# Patient Record
Sex: MALE | Race: WHITE
[De-identification: names, ages, dates, MRNs, and addresses within clinical notes are randomized per-mention and may not be internally consistent; named-entity substitution may affect disease eponyms.]

---

## 2017-08-21 ENCOUNTER — HOSPITAL ENCOUNTER (OUTPATIENT)
Dept: HOSPITAL 84 - D.LABREF | Age: 77
Discharge: HOME | End: 2017-08-21
Attending: FAMILY MEDICINE
Payer: MEDICARE

## 2017-08-21 VITALS — BODY MASS INDEX: 31.6 KG/M2

## 2017-08-21 DIAGNOSIS — I50.9: ICD-10-CM

## 2017-08-21 DIAGNOSIS — N18.9: Primary | ICD-10-CM

## 2017-08-21 LAB
ALBUMIN SERPL-MCNC: 3 G/DL (ref 3.4–5)
ALP SERPL-CCNC: 129 U/L (ref 46–116)
ALT SERPL-CCNC: 17 U/L (ref 10–68)
ANION GAP SERPL CALC-SCNC: 14 MMOL/L (ref 8–16)
BASOPHILS NFR BLD AUTO: 0.6 % (ref 0–2)
BILIRUB SERPL-MCNC: 0.68 MG/DL (ref 0.2–1.3)
BUN SERPL-MCNC: 25 MG/DL (ref 7–18)
CALCIUM SERPL-MCNC: 8.5 MG/DL (ref 8.5–10.1)
CHLORIDE SERPL-SCNC: 96 MMOL/L (ref 98–107)
CO2 SERPL-SCNC: 29.5 MMOL/L (ref 21–32)
CREAT SERPL-MCNC: 1.4 MG/DL (ref 0.6–1.3)
EOSINOPHIL NFR BLD: 3.6 % (ref 0–7)
ERYTHROCYTE [DISTWIDTH] IN BLOOD BY AUTOMATED COUNT: 13.2 % (ref 11.5–14.5)
GLOBULIN SER-MCNC: 4.1 G/L
GLUCOSE SERPL-MCNC: 121 MG/DL (ref 74–106)
HCT VFR BLD CALC: 52.7 % (ref 42–54)
HGB BLD-MCNC: 17.3 G/DL (ref 13.5–17.5)
IMM GRANULOCYTES NFR BLD: 0.3 % (ref 0–5)
LYMPHOCYTES NFR BLD AUTO: 28.6 % (ref 15–50)
MCH RBC QN AUTO: 29.2 PG (ref 26–34)
MCHC RBC AUTO-ENTMCNC: 32.8 G/DL (ref 31–37)
MCV RBC: 89 FL (ref 80–100)
MONOCYTES NFR BLD: 6.7 % (ref 2–11)
NEUTROPHILS NFR BLD AUTO: 60.2 % (ref 40–80)
OSMOLALITY SERPL CALC.SUM OF ELEC: 276 MOSM/KG (ref 275–300)
PLATELET # BLD: 139 10X3/UL (ref 130–400)
PMV BLD AUTO: 11.4 FL (ref 7.4–10.4)
POTASSIUM SERPL-SCNC: 3.5 MMOL/L (ref 3.5–5.1)
PROT SERPL-MCNC: 7.1 G/DL (ref 6.4–8.2)
RBC # BLD AUTO: 5.92 10X6/UL (ref 4.2–6.1)
SODIUM SERPL-SCNC: 136 MMOL/L (ref 136–145)
WBC # BLD AUTO: 6.8 10X3/UL (ref 4.8–10.8)

## 2017-09-11 ENCOUNTER — HOSPITAL ENCOUNTER (OUTPATIENT)
Dept: HOSPITAL 84 - D.LABREF | Age: 77
Discharge: HOME | End: 2017-09-11
Attending: FAMILY MEDICINE
Payer: MEDICARE

## 2017-09-11 VITALS — BODY MASS INDEX: 31.6 KG/M2

## 2017-09-11 DIAGNOSIS — E11.9: Primary | ICD-10-CM

## 2017-09-11 DIAGNOSIS — Z87.440: ICD-10-CM

## 2017-09-11 LAB
APPEARANCE UR: CLEAR
BACTERIA #/AREA URNS HPF: (no result) /HPF
BILIRUB SERPL-MCNC: NEGATIVE MG/DL
COLOR UR: YELLOW
EST. AVERAGE GLUCOSE BLD GHB EST-MCNC: 249 MG/DL (ref 74–154)
GLUCOSE SERPL-MCNC: 50 MG/DL
HBA1C MFR BLD: 10.3 % (ref 4.8–6)
KETONES UR STRIP-MCNC: NEGATIVE MG/DL
LEUKOCYTE ESTERASE: (no result)
NITRITE UR-MCNC: NEGATIVE MG/ML
PH UR STRIP: 5 [PH] (ref 5–6)
PROT UR-MCNC: (no result) MG/DL
RBC #/AREA URNS HPF: >50 /HPF (ref 0–5)
SP GR UR STRIP: 1.01 (ref 1–1.02)
SQUAMOUS #/AREA URNS HPF: (no result) /HPF (ref 0–5)
UROBILINOGEN UR-MCNC: 1 MG/DL

## 2017-10-12 ENCOUNTER — HOSPITAL ENCOUNTER (OUTPATIENT)
Dept: HOSPITAL 84 - D.LABREF | Age: 77
Discharge: HOME | End: 2017-10-12
Attending: FAMILY MEDICINE
Payer: MEDICARE

## 2017-10-12 VITALS — BODY MASS INDEX: 31.6 KG/M2

## 2017-10-12 DIAGNOSIS — Z86.73: ICD-10-CM

## 2017-10-12 DIAGNOSIS — N39.0: ICD-10-CM

## 2017-10-12 DIAGNOSIS — Z46.6: Primary | ICD-10-CM

## 2017-10-12 DIAGNOSIS — E11.51: ICD-10-CM

## 2017-10-12 DIAGNOSIS — N40.0: ICD-10-CM

## 2017-10-14 ENCOUNTER — HOSPITAL ENCOUNTER (INPATIENT)
Dept: HOSPITAL 84 - D.ER | Age: 77
LOS: 3 days | Discharge: HOME HEALTH | DRG: 698 | End: 2017-10-17
Attending: FAMILY MEDICINE | Admitting: FAMILY MEDICINE
Payer: MEDICARE

## 2017-10-14 VITALS — BODY MASS INDEX: 32.3 KG/M2 | BODY MASS INDEX: 33.2 KG/M2

## 2017-10-14 DIAGNOSIS — E11.22: ICD-10-CM

## 2017-10-14 DIAGNOSIS — Z86.73: ICD-10-CM

## 2017-10-14 DIAGNOSIS — I25.10: ICD-10-CM

## 2017-10-14 DIAGNOSIS — N40.0: ICD-10-CM

## 2017-10-14 DIAGNOSIS — M17.0: ICD-10-CM

## 2017-10-14 DIAGNOSIS — N18.9: ICD-10-CM

## 2017-10-14 DIAGNOSIS — Z79.4: ICD-10-CM

## 2017-10-14 DIAGNOSIS — Z74.01: ICD-10-CM

## 2017-10-14 DIAGNOSIS — I13.0: ICD-10-CM

## 2017-10-14 DIAGNOSIS — G93.41: ICD-10-CM

## 2017-10-14 DIAGNOSIS — E11.65: ICD-10-CM

## 2017-10-14 DIAGNOSIS — T83.511A: Primary | ICD-10-CM

## 2017-10-14 DIAGNOSIS — N39.0: ICD-10-CM

## 2017-10-14 DIAGNOSIS — N17.9: ICD-10-CM
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
## 2017-10-14 DIAGNOSIS — K21.9: ICD-10-CM

## 2017-10-14 DIAGNOSIS — I50.9: ICD-10-CM

## 2017-10-14 DIAGNOSIS — E86.0: ICD-10-CM

## 2017-10-14 LAB
ALBUMIN SERPL-MCNC: 2.8 G/DL (ref 3.4–5)
ALP SERPL-CCNC: 173 U/L (ref 46–116)
ALT SERPL-CCNC: 14 U/L (ref 10–68)
AMORPHOUS SEDIMENT: (no result) /LPF
ANION GAP SERPL CALC-SCNC: 12.2 MMOL/L (ref 8–16)
APPEARANCE UR: CLEAR
BACTERIA #/AREA URNS HPF: (no result) /HPF
BASOPHILS NFR BLD AUTO: 0.6 % (ref 0–2)
BILIRUB SERPL-MCNC: 0.62 MG/DL (ref 0.2–1.3)
BILIRUB SERPL-MCNC: NEGATIVE MG/DL
BUN SERPL-MCNC: 27 MG/DL (ref 7–18)
CALCIUM SERPL-MCNC: 9.3 MG/DL (ref 8.5–10.1)
CHLORIDE SERPL-SCNC: 101 MMOL/L (ref 98–107)
CK SERPL-CCNC: 32 UL (ref 21–232)
CO2 SERPL-SCNC: 31.4 MMOL/L (ref 21–32)
COLOR UR: YELLOW
CREAT SERPL-MCNC: 1.4 MG/DL (ref 0.6–1.3)
EOSINOPHIL NFR BLD: 3.3 % (ref 0–7)
ERYTHROCYTE [DISTWIDTH] IN BLOOD BY AUTOMATED COUNT: 13.6 % (ref 11.5–14.5)
GLOBULIN SER-MCNC: 4.8 G/L
GLUCOSE SERPL-MCNC: 1000 MG/DL
GLUCOSE SERPL-MCNC: 323 MG/DL (ref 74–106)
HCT VFR BLD CALC: 51.4 % (ref 42–54)
HGB BLD-MCNC: 16.9 G/DL (ref 13.5–17.5)
IMM GRANULOCYTES NFR BLD: 0.4 % (ref 0–5)
KETONES SERPL-MCNC: NEGATIVE MG/DL
KETONES UR STRIP-MCNC: NEGATIVE MG/DL
LYMPHOCYTES NFR BLD AUTO: 40.6 % (ref 15–50)
MCH RBC QN AUTO: 29.5 PG (ref 26–34)
MCHC RBC AUTO-ENTMCNC: 32.9 G/DL (ref 31–37)
MCV RBC: 89.9 FL (ref 80–100)
MONOCYTES NFR BLD: 6.7 % (ref 2–11)
NEUTROPHILS NFR BLD AUTO: 48.4 % (ref 40–80)
NITRITE UR-MCNC: POSITIVE MG/ML
OSMOLALITY SERPL CALC.SUM OF ELEC: 295 MOSM/KG (ref 275–300)
PH UR STRIP: 5 [PH] (ref 5–6)
PLATELET # BLD: 131 10X3/UL (ref 130–400)
PMV BLD AUTO: 11.1 FL (ref 7.4–10.4)
POTASSIUM SERPL-SCNC: 4.6 MMOL/L (ref 3.5–5.1)
PROT SERPL-MCNC: 7.6 G/DL (ref 6.4–8.2)
PROT UR-MCNC: (no result) MG/DL
RBC # BLD AUTO: 5.72 10X6/UL (ref 4.2–6.1)
SODIUM SERPL-SCNC: 140 MMOL/L (ref 136–145)
SP GR UR STRIP: 1.01 (ref 1–1.02)
SQUAMOUS #/AREA URNS HPF: (no result) /HPF (ref 0–5)
TROPONIN I SERPL-MCNC: < 0.017 NG/ML (ref 0–0.06)
UROBILINOGEN UR-MCNC: NORMAL MG/DL
WBC # BLD AUTO: 4.9 10X3/UL (ref 4.8–10.8)
WBC #/AREA URNS HPF: (no result) /HPF (ref 0–5)

## 2017-10-15 VITALS — SYSTOLIC BLOOD PRESSURE: 108 MMHG | DIASTOLIC BLOOD PRESSURE: 58 MMHG

## 2017-10-15 VITALS — DIASTOLIC BLOOD PRESSURE: 62 MMHG | SYSTOLIC BLOOD PRESSURE: 138 MMHG

## 2017-10-15 VITALS — SYSTOLIC BLOOD PRESSURE: 148 MMHG | DIASTOLIC BLOOD PRESSURE: 72 MMHG

## 2017-10-15 VITALS — SYSTOLIC BLOOD PRESSURE: 110 MMHG | DIASTOLIC BLOOD PRESSURE: 59 MMHG

## 2017-10-15 VITALS — SYSTOLIC BLOOD PRESSURE: 133 MMHG | DIASTOLIC BLOOD PRESSURE: 63 MMHG

## 2017-10-15 LAB
ANION GAP SERPL CALC-SCNC: 11.4 MMOL/L (ref 8–16)
BASOPHILS NFR BLD AUTO: 0.4 % (ref 0–2)
BUN SERPL-MCNC: 29 MG/DL (ref 7–18)
CALCIUM SERPL-MCNC: 9.1 MG/DL (ref 8.5–10.1)
CHLORIDE SERPL-SCNC: 101 MMOL/L (ref 98–107)
CO2 SERPL-SCNC: 29.9 MMOL/L (ref 21–32)
CREAT SERPL-MCNC: 1.4 MG/DL (ref 0.6–1.3)
EOSINOPHIL NFR BLD: 2 % (ref 0–7)
ERYTHROCYTE [DISTWIDTH] IN BLOOD BY AUTOMATED COUNT: 13.6 % (ref 11.5–14.5)
GLUCOSE SERPL-MCNC: 282 MG/DL (ref 74–106)
HCT VFR BLD CALC: 50.2 % (ref 42–54)
HGB BLD-MCNC: 16.7 G/DL (ref 13.5–17.5)
IMM GRANULOCYTES NFR BLD: 0.4 % (ref 0–5)
LYMPHOCYTES NFR BLD AUTO: 25.5 % (ref 15–50)
MCH RBC QN AUTO: 29.6 PG (ref 26–34)
MCHC RBC AUTO-ENTMCNC: 33.3 G/DL (ref 31–37)
MCV RBC: 88.8 FL (ref 80–100)
MONOCYTES NFR BLD: 9.3 % (ref 2–11)
NEUTROPHILS NFR BLD AUTO: 62.4 % (ref 40–80)
OSMOLALITY SERPL CALC.SUM OF ELEC: 291 MOSM/KG (ref 275–300)
PLATELET # BLD: 141 10X3/UL (ref 130–400)
PMV BLD AUTO: 11 FL (ref 7.4–10.4)
POTASSIUM SERPL-SCNC: 4.3 MMOL/L (ref 3.5–5.1)
RBC # BLD AUTO: 5.65 10X6/UL (ref 4.2–6.1)
SODIUM SERPL-SCNC: 138 MMOL/L (ref 136–145)
WBC # BLD AUTO: 7.2 10X3/UL (ref 4.8–10.8)

## 2017-10-15 NOTE — NUR
RECEIVED REPORT. ASSUMED CARE OF PATIENT. RESTING IN BED WITH EYES OPEN, IV
FLUIDS INFUSING AS ORDERED. RESP EVEN AND UNLABORED. DENIES NEEDS AT THIS
TIME. NO DISTRESS.

## 2017-10-15 NOTE — NUR
CALLED AND SPOKE TO PATIENTS WIFE AND PATIENT DOES TAKE HIS PILLS WHOLE, DOES
NOT NEED THEM CRUSHED. THANKED PATIENTS WIFE. SHE WILL BE UP TOMORROW. SHE IS
NOT FEELING WELL TODAY.

## 2017-10-15 NOTE — NUR
PT RESTING WITH EYES CLOSED.  RESP EVEN AND REGULAR.  SR UP X2, CALL LIGHT
WITHIN REACH AND BED ALARM ON.

## 2017-10-15 NOTE — NUR
PT ARRIVED VIA STRETCHER FROM ER AT 0010 HRS.  STAFF X4 TO PUT IN BED.  PT
BEDBOUND.  PT ALERT, ORIENTED TO PERSON AND PLACE.  REORIENTED TO TIME AND
SITUATION.  CHRONIC NORWOOD FROM HOME NOTED.  URINE DARK.  IV TO L HAND WITH
INVANZ INFUSING.  O2 2LNC.  LUNGS DIMINISHED IN BASES BILAT.  LOWER
EXTREMITIES WITH IMPAIRED ROM AND FOOT DROP.  NUMEROUS SMALL SORES NOTED TO
BACK OF UPPER THIGHS AND LOWER BACK.  DRESSING TO R HIP CLEAN, DRY AND INTACT.
 WIFE STATED SHE PUT THAT DRESSING ON AS PT KEPT SCRATCHING ONE OF THE SORES.
BED ALRM ON.  SR UP X2, CALL LIGHT WITHIN REACH.

## 2017-10-15 NOTE — NUR
PT REPOSITIONED IN  BED FOR COMFORT.  SCD'S PLACED.  WILL CONTINEU TO MONITOR.
 SR UP X2, CALL LIGHT WITHIN REACH AND BED ALARM ON.

## 2017-10-15 NOTE — NUR
RESUMED CARE OF PT, LYING IN BED RESPIRATIONS EVEN AND UNLABORED ON 2LPM VIA
NC.  NORWOOD TO GRAVITY.  LEFT HAND INFUSING NS @ 100.  FAMILY AT BEDSIDE, CALL
LIGHT IN REACH.  WILL CONTINUE TO MONITOR.  SEE NURSE ASSESSMENT.

## 2017-10-16 VITALS — SYSTOLIC BLOOD PRESSURE: 118 MMHG | DIASTOLIC BLOOD PRESSURE: 56 MMHG

## 2017-10-16 VITALS — DIASTOLIC BLOOD PRESSURE: 60 MMHG | SYSTOLIC BLOOD PRESSURE: 122 MMHG

## 2017-10-16 VITALS — DIASTOLIC BLOOD PRESSURE: 50 MMHG | SYSTOLIC BLOOD PRESSURE: 131 MMHG

## 2017-10-16 VITALS — SYSTOLIC BLOOD PRESSURE: 133 MMHG | DIASTOLIC BLOOD PRESSURE: 52 MMHG

## 2017-10-16 VITALS — SYSTOLIC BLOOD PRESSURE: 129 MMHG | DIASTOLIC BLOOD PRESSURE: 69 MMHG

## 2017-10-16 VITALS — SYSTOLIC BLOOD PRESSURE: 137 MMHG | DIASTOLIC BLOOD PRESSURE: 66 MMHG

## 2017-10-16 LAB
ANION GAP SERPL CALC-SCNC: 11.5 MMOL/L (ref 8–16)
BASOPHILS NFR BLD AUTO: 0.3 % (ref 0–2)
BUN SERPL-MCNC: 22 MG/DL (ref 7–18)
CALCIUM SERPL-MCNC: 8.9 MG/DL (ref 8.5–10.1)
CHLORIDE SERPL-SCNC: 104 MMOL/L (ref 98–107)
CO2 SERPL-SCNC: 29.5 MMOL/L (ref 21–32)
CREAT SERPL-MCNC: 1.1 MG/DL (ref 0.6–1.3)
EOSINOPHIL NFR BLD: 3 % (ref 0–7)
ERYTHROCYTE [DISTWIDTH] IN BLOOD BY AUTOMATED COUNT: 13.8 % (ref 11.5–14.5)
GLUCOSE SERPL-MCNC: 236 MG/DL (ref 74–106)
HCT VFR BLD CALC: 47.9 % (ref 42–54)
HGB BLD-MCNC: 16 G/DL (ref 13.5–17.5)
IMM GRANULOCYTES NFR BLD: 0.5 % (ref 0–5)
LYMPHOCYTES NFR BLD AUTO: 29.4 % (ref 15–50)
MAGNESIUM SERPL-MCNC: 1.5 MG/DL (ref 1.8–2.4)
MCH RBC QN AUTO: 29.5 PG (ref 26–34)
MCHC RBC AUTO-ENTMCNC: 33.4 G/DL (ref 31–37)
MCV RBC: 88.4 FL (ref 80–100)
MONOCYTES NFR BLD: 8.9 % (ref 2–11)
NEUTROPHILS NFR BLD AUTO: 57.9 % (ref 40–80)
OSMOLALITY SERPL CALC.SUM OF ELEC: 291 MOSM/KG (ref 275–300)
PLATELET # BLD: 137 10X3/UL (ref 130–400)
PMV BLD AUTO: 11.4 FL (ref 7.4–10.4)
POTASSIUM SERPL-SCNC: 4 MMOL/L (ref 3.5–5.1)
RBC # BLD AUTO: 5.42 10X6/UL (ref 4.2–6.1)
SODIUM SERPL-SCNC: 141 MMOL/L (ref 136–145)
WBC # BLD AUTO: 6.3 10X3/UL (ref 4.8–10.8)

## 2017-10-16 NOTE — NUR
PT IN BED RESTING QUIETLY. DENIES ANY PAIN OR NEEDS AT THIS TIME. BED IN LOW
POSITION, CALL LIGHT WITHIN REACH.

## 2017-10-16 NOTE — NUR
PT IN BED RESTING QUEITLY. DENIES ANY PAIN OR NEEDS AT THIS TIME. BED IN LOW
POSITION, CALL LIGHT WITHIN REACH.

## 2017-10-16 NOTE — NUR
AM ROOUNDING WITH PATIENT APPEARING ASLEEP. RESP ARE EVEN AND NON LABORED. ON
BACK. NORWOOD CATH SEEN WITH YELLOW URINE. BILATERAL SCD'S ARE IN USE. LEFT HAND
SEEN WITH NS INFUSING  CC/HR. POSEY ALARM IN USE. WILL MONITOR.

## 2017-10-17 VITALS — DIASTOLIC BLOOD PRESSURE: 49 MMHG | SYSTOLIC BLOOD PRESSURE: 121 MMHG

## 2017-10-17 VITALS — SYSTOLIC BLOOD PRESSURE: 143 MMHG | DIASTOLIC BLOOD PRESSURE: 65 MMHG

## 2017-10-17 VITALS — SYSTOLIC BLOOD PRESSURE: 149 MMHG | DIASTOLIC BLOOD PRESSURE: 64 MMHG

## 2017-10-17 LAB
ANION GAP SERPL CALC-SCNC: 9 MMOL/L (ref 8–16)
BASOPHILS NFR BLD AUTO: 0.5 % (ref 0–2)
BUN SERPL-MCNC: 15 MG/DL (ref 7–18)
CALCIUM SERPL-MCNC: 8.6 MG/DL (ref 8.5–10.1)
CHLORIDE SERPL-SCNC: 105 MMOL/L (ref 98–107)
CO2 SERPL-SCNC: 30.6 MMOL/L (ref 21–32)
CREAT SERPL-MCNC: 1 MG/DL (ref 0.6–1.3)
EOSINOPHIL NFR BLD: 3.9 % (ref 0–7)
ERYTHROCYTE [DISTWIDTH] IN BLOOD BY AUTOMATED COUNT: 13.7 % (ref 11.5–14.5)
GLUCOSE SERPL-MCNC: 182 MG/DL (ref 74–106)
HCT VFR BLD CALC: 47.4 % (ref 42–54)
HGB BLD-MCNC: 15.6 G/DL (ref 13.5–17.5)
IMM GRANULOCYTES NFR BLD: 0.4 % (ref 0–5)
LYMPHOCYTES NFR BLD AUTO: 26.4 % (ref 15–50)
MCH RBC QN AUTO: 29.3 PG (ref 26–34)
MCHC RBC AUTO-ENTMCNC: 32.9 G/DL (ref 31–37)
MCV RBC: 88.9 FL (ref 80–100)
MONOCYTES NFR BLD: 10.1 % (ref 2–11)
NEUTROPHILS NFR BLD AUTO: 58.7 % (ref 40–80)
OSMOLALITY SERPL CALC.SUM OF ELEC: 286 MOSM/KG (ref 275–300)
PLATELET # BLD: 125 10X3/UL (ref 130–400)
PMV BLD AUTO: 11.2 FL (ref 7.4–10.4)
POTASSIUM SERPL-SCNC: 3.6 MMOL/L (ref 3.5–5.1)
RBC # BLD AUTO: 5.33 10X6/UL (ref 4.2–6.1)
SODIUM SERPL-SCNC: 141 MMOL/L (ref 136–145)
WBC # BLD AUTO: 5.6 10X3/UL (ref 4.8–10.8)

## 2017-10-17 NOTE — NUR
NORWOOD CATHETER CHANGED AT THIS TIME PER ORDERS FROM /[facility]. 16 FR
NORWOOD PLACED VIA STERILE TECHNIQUE. APPROX 75 CC PALE YELLOW URINE RECEIVED
UPON INSERTION OF F/C. NORWOOD CATHETER TO DRAINAGE BAG VIA GRAVITY. PATIENT
TOLERATED PROCEDURE WELL. PATIENT DENIES ANY PAIN OR DISCOMFORT. CALL LIGHT
PLACED WITHIN REACH. NO DISTRESS.

## 2017-10-17 NOTE — NUR
20 GAUGE IV REMOVED FROM LEFT HAND AS PATIENT IS BEING DISCHARGED TO HOME VIA
AMBULANCE. CATHETER INTACT. NO BLEEDING FROM SITE. 2X2 GAUZE APPLIED AND
SECURED WITH TAPE. TOLERATED REMOVAL OF IV WELL. NO DISTRESS.

## 2017-10-17 NOTE — NUR
CALLED AND SPOKE TO PATIENTS WIFE. [facility] HERE TO GET PATIENT AT THIS TIME.
PATIENT ATTEMPTED TO SIGN HIS DISCHARGE INSTRUCTIONS. INSTRUCTIONS PROVIDED TO
WIFE AND PATIENT AND WENT OVER MEDICATIONS CALLED TO THE PHARMACY.

## 2017-10-17 NOTE — NUR
10/16/17 Wound care (late entry) 1245
Noted numerous scabbed areas on legs and trunk-pt states that it was a "rash".
There is an open wound on right hip that measures 1.5cm x 1.5cm.  It has dry
wound bed and periwound. No drainage or odor noted.
Just above gluteal cleft is a skin fold running vertically. A skin tear is
noted in the fold.  There is another skin fold running diagonally that has a
skin tear.
The pt is being repositioned q 2 hours with maximum assistance.  An air
overlay mattress was placed on the bed to help decrease pressure and moisture.
He is incontinent.
Recommend calmoseptine cream to protect from moisture and covering right hip
open wound with bordered gauze to protect it.  Continue q2h position changes
and personal care as necessary.
Wound care will continue to monitor.

## 2017-10-17 NOTE — NUR
PAT[facility]. NO DISTRESS
UPON LEAVING UNIT. WIFE CALLED AND NOTIFIED THAT PATIENT WAS ON HIS WAY AT
THIS TIME.

## 2017-10-17 NOTE — NUR
Patient Name: ADAM BARKSDALE Admission Status: ER
Accout number: [id number] Admission Date: 10-
: 1940 Admission Diagnosis:
Attending: ROWDY SANTANA Current LOS: 3
 
Anticipated DC Date: 10-
Planned Disposition: Home with Home Health
Primary Insurance: MEDICARE A & B
PLANNED EXTERNAL PROVIDER: [facility]
 
LATE ENTRY:
Discharge Planning Comments:
* Is the patient Alert and Oriented? Yes 0
* How many steps to enter\exit or inside your home? RAMP 0
* PCP DR. VELASCO 0
* Pharmacy [facility]
* Preadmission Environment Home with Family 0
* ADLs Partial Dependent 0
* Partial ADLs (Assistance needed) Bathing
Dressing
Medication Management
Toileting
Transfers 0
* Equipment Hospital Bed
Suman Lift
Wheelchair 0
* Other Equipment NO MEDICAL EQUIPMENT PROVIDER PREFERENCE 0
* List name and contact numbers for known caregivers / representatives who
currently or will assist patient after discharge: HUSSEIN BARKSDALE, SPOUSE,
[phone] 0
* Community resources currently utilized Home Health 0
* Please name any agencies selected above. [facility] 0
* Additional services required to return to the preadmission environment? No 0
* Can the patient safely return to the preadmission environment? Yes 0
* Has this patient been hospitalized within the prior 30 days at any hospital?
No 0
 
CM RECEIVED DISCHARGE ORDER, MET WITH PT IN ROOM TO DISCUSS DISCHARGE PLANNING
AND NEEDS. PT REPORTS HAVING ALL NEEDED MEDICAL EQUIPMENT AT HOME AND PT'S
SPOUSE TAKES CARE OF ALL OF PT'S NEEDS. PT HAS HOME HEALTH WITH [facility]. PT'S
NURSE REPORTS PT ALSO HAS [facility] VISITING; PT'S NURSE HAS SPOKEN TO
PT'S SPOUSE WHO IS IN AGREEMENT WITH DISCHARGE PLAN HOME TODAY WITH HOME
HEALTH RESUMPTION, SHE IS HOME TO RECEIVE PT VIA AMBULANCE. PT'S NURSE WILL
CALL AMBULANCE AND NOTIFY PT'S SPOUSE WHEN AMBULANCE LEAVES HOSPITAL WITH PT.
PT HAPPY ABOUT GETTING TO GO HOME, IMPORTANT MESSAGE FROM MEDICARE PROVIDED
AND DISCUSSED. CM RECEIVED CALL FROM LAKEISHA OF [facility], SHE HAS
BEEN CONTACTED BY PT'S SPOUSE AND REQUESTED DISCHARGE INFORMATION. CM FAXED
DISCHARGE INFORMATION TO [facility] -[phone].
 
: Vitor Nathan

## 2017-10-17 NOTE — NUR
SPPOKE TO PATIENTS WIFE AND SHE IS UPSET THAT PATIENT IS BEING DISCHARGED
PRIOR TO HAVING RASH DIAGNOSED ON HIS BACK. PATIENTS WIFE STATES THAT WE HAVE
TO LET DISPATCH FROM [facility] KNOW THAT IT TOOK 6 PEOPLE TO GET HIM TO THE
BEDROOM ONTO THE STRETCHER BECAUSE THEY CAN'T GET THE STRETCHER DOWN THE [address].
INFORMED PATIENTS WIFE THAT I WOULD LET TRANSPORT KNOW AND THAT ALSO WE WOULD
BE CHANGING PATIENTS NORWOOD CATHETER PRIOR TO HIM LEAVING TODAY. WILL CALL
PATIENTS WIFE WHEN PATIENT LEAVES FACILITY TO HOME.

## 2017-10-17 NOTE — NUR
SPOKE WITH TREVOR AT [facility] TO ARRANGE TRANSPORTATION HOME. INFORMED TREVOR OF
THE NEED FOR EXTRA PEOPLE TO GET PATIENT INTO THE HOME. TREVOR STATED HE SHOULD
HAVE SOMEONE HERE WITHIN ABOUT 45 MINUTES

## 2017-12-06 ENCOUNTER — HOSPITAL ENCOUNTER (OUTPATIENT)
Dept: HOSPITAL 84 - D.LABREF | Age: 77
Discharge: HOME | End: 2017-12-06
Attending: FAMILY MEDICINE
Payer: MEDICARE

## 2017-12-06 VITALS — BODY MASS INDEX: 33.2 KG/M2

## 2017-12-06 DIAGNOSIS — N39.0: ICD-10-CM

## 2017-12-06 DIAGNOSIS — T83.511A: ICD-10-CM

## 2017-12-06 DIAGNOSIS — N31.9: Primary | ICD-10-CM

## 2017-12-06 DIAGNOSIS — E11.51: ICD-10-CM

## 2017-12-06 DIAGNOSIS — N40.0: ICD-10-CM

## 2017-12-06 LAB
APPEARANCE UR: CLEAR
BACTERIA #/AREA URNS HPF: (no result) /HPF
BILIRUB SERPL-MCNC: NEGATIVE MG/DL
COLOR UR: YELLOW
GLUCOSE SERPL-MCNC: NEGATIVE MG/DL
KETONES UR STRIP-MCNC: NEGATIVE MG/DL
NITRITE UR-MCNC: NEGATIVE MG/ML
PH UR STRIP: 5 [PH] (ref 5–6)
PROT UR-MCNC: NEGATIVE MG/DL
RBC #/AREA URNS HPF: (no result) /HPF (ref 0–5)
SP GR UR STRIP: 1.01 (ref 1–1.02)
UROBILINOGEN UR-MCNC: NORMAL MG/DL
WBC #/AREA URNS HPF: (no result) /HPF (ref 0–5)

## 2017-12-20 ENCOUNTER — HOSPITAL ENCOUNTER (OUTPATIENT)
Dept: HOSPITAL 84 - D.LABREF | Age: 77
Discharge: HOME | End: 2017-12-20
Attending: FAMILY MEDICINE
Payer: MEDICARE

## 2017-12-20 VITALS — BODY MASS INDEX: 33.2 KG/M2

## 2017-12-20 DIAGNOSIS — N31.9: ICD-10-CM

## 2017-12-20 DIAGNOSIS — N39.0: Primary | ICD-10-CM

## 2017-12-20 DIAGNOSIS — T83.511A: ICD-10-CM

## 2017-12-20 DIAGNOSIS — N40.0: ICD-10-CM

## 2017-12-20 LAB
APPEARANCE UR: (no result)
BACTERIA #/AREA URNS HPF: (no result) /HPF
BILIRUB SERPL-MCNC: NEGATIVE MG/DL
COLOR UR: YELLOW
GLUCOSE SERPL-MCNC: NEGATIVE MG/DL
KETONES UR STRIP-MCNC: NEGATIVE MG/DL
NITRITE UR-MCNC: NEGATIVE MG/ML
PH UR STRIP: 5 [PH] (ref 5–6)
PROT UR-MCNC: (no result) MG/DL
RBC #/AREA URNS HPF: (no result) /HPF (ref 0–5)
SP GR UR STRIP: 1.01 (ref 1–1.02)
UROBILINOGEN UR-MCNC: NORMAL MG/DL
WBC #/AREA URNS HPF: >50 /HPF (ref 0–5)

## 2017-12-27 ENCOUNTER — HOSPITAL ENCOUNTER (OUTPATIENT)
Dept: HOSPITAL 84 - D.LABREF | Age: 77
Discharge: HOME | End: 2017-12-27
Attending: FAMILY MEDICINE
Payer: MEDICARE

## 2017-12-27 VITALS — BODY MASS INDEX: 33.2 KG/M2

## 2017-12-27 DIAGNOSIS — N39.0: Primary | ICD-10-CM

## 2017-12-27 LAB
APPEARANCE UR: (no result)
BACTERIA #/AREA URNS HPF: (no result) /HPF
BILIRUB SERPL-MCNC: NEGATIVE MG/DL
COLOR UR: YELLOW
GLUCOSE SERPL-MCNC: 250 MG/DL
KETONES UR STRIP-MCNC: NEGATIVE MG/DL
NITRITE UR-MCNC: NEGATIVE MG/ML
PH UR STRIP: 5 [PH] (ref 5–6)
PROT UR-MCNC: (no result) MG/DL
RBC #/AREA URNS HPF: (no result) /HPF (ref 0–5)
SP GR UR STRIP: 1.02 (ref 1–1.02)
SQUAMOUS #/AREA URNS HPF: (no result) /HPF (ref 0–5)
UROBILINOGEN UR-MCNC: NORMAL MG/DL
WBC #/AREA URNS HPF: >50 /HPF (ref 0–5)

## 2017-12-30 ENCOUNTER — HOSPITAL ENCOUNTER (INPATIENT)
Dept: HOSPITAL 84 - D.ER | Age: 77
LOS: 9 days | Discharge: NURSING, REHAB OR LONG-TERM CARE | DRG: 177 | End: 2018-01-08
Attending: FAMILY MEDICINE | Admitting: FAMILY MEDICINE
Payer: MEDICARE

## 2017-12-30 VITALS
BODY MASS INDEX: 34.15 KG/M2 | BODY MASS INDEX: 34.15 KG/M2 | WEIGHT: 266.1 LBS | HEIGHT: 74 IN | HEIGHT: 74 IN | BODY MASS INDEX: 34.15 KG/M2 | WEIGHT: 266.1 LBS | BODY MASS INDEX: 34.15 KG/M2

## 2017-12-30 DIAGNOSIS — T83.518A: ICD-10-CM

## 2017-12-30 DIAGNOSIS — J90: ICD-10-CM

## 2017-12-30 DIAGNOSIS — I10: ICD-10-CM

## 2017-12-30 DIAGNOSIS — E11.65: ICD-10-CM

## 2017-12-30 DIAGNOSIS — E78.5: ICD-10-CM

## 2017-12-30 DIAGNOSIS — J20.9: ICD-10-CM

## 2017-12-30 DIAGNOSIS — J44.0: ICD-10-CM

## 2017-12-30 DIAGNOSIS — F03.90: ICD-10-CM

## 2017-12-30 DIAGNOSIS — R76.11: ICD-10-CM

## 2017-12-30 DIAGNOSIS — J98.11: ICD-10-CM

## 2017-12-30 DIAGNOSIS — J44.1: ICD-10-CM

## 2017-12-30 DIAGNOSIS — R53.2: ICD-10-CM

## 2017-12-30 DIAGNOSIS — N39.0: ICD-10-CM

## 2017-12-30 DIAGNOSIS — L89.322: ICD-10-CM

## 2017-12-30 DIAGNOSIS — I25.10: ICD-10-CM

## 2017-12-30 DIAGNOSIS — J69.0: Primary | ICD-10-CM

## 2017-12-30 DIAGNOSIS — J96.21: ICD-10-CM

## 2017-12-30 LAB
ALBUMIN SERPL-MCNC: 3.2 G/DL (ref 3.4–5)
ALP SERPL-CCNC: 155 U/L (ref 46–116)
ALT SERPL-CCNC: 11 U/L (ref 10–68)
ANION GAP SERPL CALC-SCNC: 11.5 MMOL/L (ref 8–16)
APPEARANCE UR: (no result)
BACTERIA #/AREA URNS HPF: (no result) /HPF
BASOPHILS NFR BLD AUTO: 0.2 % (ref 0–2)
BILIRUB SERPL-MCNC: 0.74 MG/DL (ref 0.2–1.3)
BILIRUB SERPL-MCNC: NEGATIVE MG/DL
BUN SERPL-MCNC: 25 MG/DL (ref 7–18)
CALCIUM SERPL-MCNC: 9.8 MG/DL (ref 8.5–10.1)
CHLORIDE SERPL-SCNC: 98 MMOL/L (ref 98–107)
CK SERPL-CCNC: 25 UL (ref 21–232)
CO2 SERPL-SCNC: 32.6 MMOL/L (ref 21–32)
COLOR UR: YELLOW
CREAT SERPL-MCNC: 1.3 MG/DL (ref 0.6–1.3)
EOSINOPHIL NFR BLD: 2.1 % (ref 0–7)
ERYTHROCYTE [DISTWIDTH] IN BLOOD BY AUTOMATED COUNT: 13.1 % (ref 11.5–14.5)
GLOBULIN SER-MCNC: 4.8 G/L
GLUCOSE SERPL-MCNC: 292 MG/DL (ref 74–106)
GLUCOSE SERPL-MCNC: NEGATIVE MG/DL
HCT VFR BLD CALC: 52.4 % (ref 42–54)
HGB BLD-MCNC: 17.3 G/DL (ref 13.5–17.5)
IMM GRANULOCYTES NFR BLD: 0.1 % (ref 0–5)
KETONES UR STRIP-MCNC: NEGATIVE MG/DL
LYMPHOCYTES NFR BLD AUTO: 27.8 % (ref 15–50)
MCH RBC QN AUTO: 29.4 PG (ref 26–34)
MCHC RBC AUTO-ENTMCNC: 33 G/DL (ref 31–37)
MCV RBC: 89 FL (ref 80–100)
MONOCYTES NFR BLD: 5.5 % (ref 2–11)
NEUTROPHILS NFR BLD AUTO: 64.3 % (ref 40–80)
NITRITE UR-MCNC: NEGATIVE MG/ML
NT-PROBNP SERPL-MCNC: 232 PG/ML (ref 0–450)
OSMOLALITY SERPL CALC.SUM OF ELEC: 290 MOSM/KG (ref 275–300)
PH UR STRIP: 5 [PH] (ref 5–6)
PLATELET # BLD: 147 10X3/UL (ref 130–400)
PMV BLD AUTO: 10.5 FL (ref 7.4–10.4)
POTASSIUM SERPL-SCNC: 4.1 MMOL/L (ref 3.5–5.1)
PROT SERPL-MCNC: 8 G/DL (ref 6.4–8.2)
PROT UR-MCNC: NEGATIVE MG/DL
RBC # BLD AUTO: 5.89 10X6/UL (ref 4.2–6.1)
RBC #/AREA URNS HPF: (no result) /HPF (ref 0–5)
SODIUM SERPL-SCNC: 138 MMOL/L (ref 136–145)
SP GR UR STRIP: 1.02 (ref 1–1.02)
TROPONIN I SERPL-MCNC: < 0.017 NG/ML (ref 0–0.06)
UROBILINOGEN UR-MCNC: NORMAL MG/DL
WBC # BLD AUTO: 8.2 10X3/UL (ref 4.8–10.8)

## 2017-12-31 VITALS — SYSTOLIC BLOOD PRESSURE: 108 MMHG | DIASTOLIC BLOOD PRESSURE: 62 MMHG

## 2017-12-31 VITALS — DIASTOLIC BLOOD PRESSURE: 60 MMHG | SYSTOLIC BLOOD PRESSURE: 104 MMHG

## 2017-12-31 VITALS — SYSTOLIC BLOOD PRESSURE: 135 MMHG | DIASTOLIC BLOOD PRESSURE: 71 MMHG

## 2017-12-31 VITALS — DIASTOLIC BLOOD PRESSURE: 62 MMHG | SYSTOLIC BLOOD PRESSURE: 128 MMHG

## 2017-12-31 VITALS — SYSTOLIC BLOOD PRESSURE: 135 MMHG | DIASTOLIC BLOOD PRESSURE: 52 MMHG

## 2018-01-01 VITALS — DIASTOLIC BLOOD PRESSURE: 50 MMHG | SYSTOLIC BLOOD PRESSURE: 104 MMHG

## 2018-01-01 VITALS — DIASTOLIC BLOOD PRESSURE: 57 MMHG | SYSTOLIC BLOOD PRESSURE: 122 MMHG

## 2018-01-01 VITALS — DIASTOLIC BLOOD PRESSURE: 64 MMHG | SYSTOLIC BLOOD PRESSURE: 106 MMHG

## 2018-01-01 VITALS — DIASTOLIC BLOOD PRESSURE: 55 MMHG | SYSTOLIC BLOOD PRESSURE: 118 MMHG

## 2018-01-01 VITALS — SYSTOLIC BLOOD PRESSURE: 131 MMHG | DIASTOLIC BLOOD PRESSURE: 50 MMHG

## 2018-01-01 LAB
ALBUMIN SERPL-MCNC: 2.8 G/DL (ref 3.4–5)
ALP SERPL-CCNC: 135 U/L (ref 46–116)
ALT SERPL-CCNC: 12 U/L (ref 10–68)
ANION GAP SERPL CALC-SCNC: 12.7 MMOL/L (ref 8–16)
BASOPHILS NFR BLD AUTO: 0.3 % (ref 0–2)
BILIRUB SERPL-MCNC: 0.69 MG/DL (ref 0.2–1.3)
BUN SERPL-MCNC: 34 MG/DL (ref 7–18)
CALCIUM SERPL-MCNC: 9.1 MG/DL (ref 8.5–10.1)
CHLORIDE SERPL-SCNC: 99 MMOL/L (ref 98–107)
CO2 SERPL-SCNC: 29.7 MMOL/L (ref 21–32)
CREAT SERPL-MCNC: 1.5 MG/DL (ref 0.6–1.3)
EOSINOPHIL NFR BLD: 1.5 % (ref 0–7)
ERYTHROCYTE [DISTWIDTH] IN BLOOD BY AUTOMATED COUNT: 13.2 % (ref 11.5–14.5)
EST. AVERAGE GLUCOSE BLD GHB EST-MCNC: 212 MG/DL (ref 74–154)
GLOBULIN SER-MCNC: 4.3 G/L
GLUCOSE SERPL-MCNC: 181 MG/DL (ref 74–106)
HBA1C MFR BLD: 9 % (ref 4.8–6)
HCT VFR BLD CALC: 48.8 % (ref 42–54)
HGB BLD-MCNC: 16.2 G/DL (ref 13.5–17.5)
IMM GRANULOCYTES NFR BLD: 0.3 % (ref 0–5)
LYMPHOCYTES NFR BLD AUTO: 21.8 % (ref 15–50)
MCH RBC QN AUTO: 29.3 PG (ref 26–34)
MCHC RBC AUTO-ENTMCNC: 33.2 G/DL (ref 31–37)
MCV RBC: 88.2 FL (ref 80–100)
MONOCYTES NFR BLD: 9 % (ref 2–11)
NEUTROPHILS NFR BLD AUTO: 67.1 % (ref 40–80)
OSMOLALITY SERPL CALC.SUM OF ELEC: 288 MOSM/KG (ref 275–300)
PLATELET # BLD: 164 10X3/UL (ref 130–400)
PMV BLD AUTO: 10.7 FL (ref 7.4–10.4)
POTASSIUM SERPL-SCNC: 3.4 MMOL/L (ref 3.5–5.1)
PROT SERPL-MCNC: 7.1 G/DL (ref 6.4–8.2)
RBC # BLD AUTO: 5.53 10X6/UL (ref 4.2–6.1)
SODIUM SERPL-SCNC: 138 MMOL/L (ref 136–145)
TSH SERPL-ACNC: 3.7 UIU/ML (ref 0.36–3.74)
WBC # BLD AUTO: 9.1 10X3/UL (ref 4.8–10.8)

## 2018-01-02 VITALS — DIASTOLIC BLOOD PRESSURE: 59 MMHG | SYSTOLIC BLOOD PRESSURE: 123 MMHG

## 2018-01-02 VITALS — SYSTOLIC BLOOD PRESSURE: 104 MMHG | DIASTOLIC BLOOD PRESSURE: 62 MMHG

## 2018-01-02 VITALS — SYSTOLIC BLOOD PRESSURE: 114 MMHG | DIASTOLIC BLOOD PRESSURE: 54 MMHG

## 2018-01-02 VITALS — DIASTOLIC BLOOD PRESSURE: 52 MMHG | SYSTOLIC BLOOD PRESSURE: 97 MMHG

## 2018-01-02 VITALS — DIASTOLIC BLOOD PRESSURE: 55 MMHG | SYSTOLIC BLOOD PRESSURE: 98 MMHG

## 2018-01-02 VITALS — DIASTOLIC BLOOD PRESSURE: 60 MMHG | SYSTOLIC BLOOD PRESSURE: 121 MMHG

## 2018-01-02 LAB
ALBUMIN SERPL-MCNC: 2.5 G/DL (ref 3.4–5)
ALP SERPL-CCNC: 118 U/L (ref 46–116)
ALT SERPL-CCNC: 12 U/L (ref 10–68)
ANION GAP SERPL CALC-SCNC: 11.8 MMOL/L (ref 8–16)
BASOPHILS NFR BLD AUTO: 0.3 % (ref 0–2)
BILIRUB SERPL-MCNC: 0.74 MG/DL (ref 0.2–1.3)
BUN SERPL-MCNC: 36 MG/DL (ref 7–18)
CALCIUM SERPL-MCNC: 8.7 MG/DL (ref 8.5–10.1)
CHLORIDE SERPL-SCNC: 101 MMOL/L (ref 98–107)
CO2 SERPL-SCNC: 29.5 MMOL/L (ref 21–32)
CREAT SERPL-MCNC: 1.5 MG/DL (ref 0.6–1.3)
EOSINOPHIL NFR BLD: 2.1 % (ref 0–7)
ERYTHROCYTE [DISTWIDTH] IN BLOOD BY AUTOMATED COUNT: 13.5 % (ref 11.5–14.5)
GLOBULIN SER-MCNC: 4.3 G/L
GLUCOSE SERPL-MCNC: 256 MG/DL (ref 74–106)
HCT VFR BLD CALC: 44.8 % (ref 42–54)
HGB BLD-MCNC: 14.8 G/DL (ref 13.5–17.5)
IMM GRANULOCYTES NFR BLD: 0.1 % (ref 0–5)
LYMPHOCYTES NFR BLD AUTO: 13.5 % (ref 15–50)
MCH RBC QN AUTO: 29.1 PG (ref 26–34)
MCHC RBC AUTO-ENTMCNC: 33 G/DL (ref 31–37)
MCV RBC: 88 FL (ref 80–100)
MONOCYTES NFR BLD: 10.3 % (ref 2–11)
NEUTROPHILS NFR BLD AUTO: 73.7 % (ref 40–80)
OSMOLALITY SERPL CALC.SUM OF ELEC: 294 MOSM/KG (ref 275–300)
PLATELET # BLD: 139 10X3/UL (ref 130–400)
PMV BLD AUTO: 10.7 FL (ref 7.4–10.4)
POTASSIUM SERPL-SCNC: 3.3 MMOL/L (ref 3.5–5.1)
PROT SERPL-MCNC: 6.8 G/DL (ref 6.4–8.2)
RBC # BLD AUTO: 5.09 10X6/UL (ref 4.2–6.1)
SODIUM SERPL-SCNC: 139 MMOL/L (ref 136–145)
WBC # BLD AUTO: 7.7 10X3/UL (ref 4.8–10.8)

## 2018-01-03 VITALS — SYSTOLIC BLOOD PRESSURE: 108 MMHG | DIASTOLIC BLOOD PRESSURE: 55 MMHG

## 2018-01-03 VITALS — SYSTOLIC BLOOD PRESSURE: 130 MMHG | DIASTOLIC BLOOD PRESSURE: 53 MMHG

## 2018-01-03 VITALS — DIASTOLIC BLOOD PRESSURE: 71 MMHG | SYSTOLIC BLOOD PRESSURE: 123 MMHG

## 2018-01-03 VITALS — SYSTOLIC BLOOD PRESSURE: 95 MMHG | DIASTOLIC BLOOD PRESSURE: 52 MMHG

## 2018-01-03 VITALS — DIASTOLIC BLOOD PRESSURE: 49 MMHG | SYSTOLIC BLOOD PRESSURE: 82 MMHG

## 2018-01-03 VITALS — DIASTOLIC BLOOD PRESSURE: 62 MMHG | SYSTOLIC BLOOD PRESSURE: 106 MMHG

## 2018-01-03 LAB
ALBUMIN SERPL-MCNC: 2.4 G/DL (ref 3.4–5)
ALP SERPL-CCNC: 108 U/L (ref 46–116)
ALT SERPL-CCNC: 15 U/L (ref 10–68)
ANION GAP SERPL CALC-SCNC: 9.5 MMOL/L (ref 8–16)
BASOPHILS NFR BLD AUTO: 0.3 % (ref 0–2)
BILIRUB SERPL-MCNC: 0.6 MG/DL (ref 0.2–1.3)
BUN SERPL-MCNC: 35 MG/DL (ref 7–18)
CALCIUM SERPL-MCNC: 8.6 MG/DL (ref 8.5–10.1)
CHLORIDE SERPL-SCNC: 98 MMOL/L (ref 98–107)
CO2 SERPL-SCNC: 30.7 MMOL/L (ref 21–32)
CREAT SERPL-MCNC: 1.5 MG/DL (ref 0.6–1.3)
EOSINOPHIL NFR BLD: 2 % (ref 0–7)
ERYTHROCYTE [DISTWIDTH] IN BLOOD BY AUTOMATED COUNT: 13.6 % (ref 11.5–14.5)
GLOBULIN SER-MCNC: 4.4 G/L
GLUCOSE SERPL-MCNC: 152 MG/DL (ref 74–106)
HCT VFR BLD CALC: 44.9 % (ref 42–54)
HGB BLD-MCNC: 14.8 G/DL (ref 13.5–17.5)
IMM GRANULOCYTES NFR BLD: 0.2 % (ref 0–5)
LYMPHOCYTES NFR BLD AUTO: 13 % (ref 15–50)
MCH RBC QN AUTO: 29 PG (ref 26–34)
MCHC RBC AUTO-ENTMCNC: 33 G/DL (ref 31–37)
MCV RBC: 87.9 FL (ref 80–100)
MONOCYTES NFR BLD: 13.3 % (ref 2–11)
NEUTROPHILS NFR BLD AUTO: 71.2 % (ref 40–80)
OSMOLALITY SERPL CALC.SUM OF ELEC: 280 MOSM/KG (ref 275–300)
PLATELET # BLD: 133 10X3/UL (ref 130–400)
PMV BLD AUTO: 10.9 FL (ref 7.4–10.4)
POTASSIUM SERPL-SCNC: 3.2 MMOL/L (ref 3.5–5.1)
PROT SERPL-MCNC: 6.8 G/DL (ref 6.4–8.2)
RBC # BLD AUTO: 5.11 10X6/UL (ref 4.2–6.1)
SODIUM SERPL-SCNC: 135 MMOL/L (ref 136–145)
WBC # BLD AUTO: 6.1 10X3/UL (ref 4.8–10.8)

## 2018-01-04 VITALS — SYSTOLIC BLOOD PRESSURE: 102 MMHG | DIASTOLIC BLOOD PRESSURE: 58 MMHG

## 2018-01-04 VITALS — SYSTOLIC BLOOD PRESSURE: 133 MMHG | DIASTOLIC BLOOD PRESSURE: 66 MMHG

## 2018-01-04 VITALS — SYSTOLIC BLOOD PRESSURE: 107 MMHG | DIASTOLIC BLOOD PRESSURE: 71 MMHG

## 2018-01-04 VITALS — DIASTOLIC BLOOD PRESSURE: 61 MMHG | SYSTOLIC BLOOD PRESSURE: 123 MMHG

## 2018-01-04 VITALS — DIASTOLIC BLOOD PRESSURE: 71 MMHG | SYSTOLIC BLOOD PRESSURE: 130 MMHG

## 2018-01-04 LAB
ALBUMIN SERPL-MCNC: 2.9 G/DL (ref 3.4–5)
ALP SERPL-CCNC: 111 U/L (ref 46–116)
ALT SERPL-CCNC: 18 U/L (ref 10–68)
ANION GAP SERPL CALC-SCNC: 11.4 MMOL/L (ref 8–16)
APPEARANCE UR: (no result)
BASOPHILS NFR BLD AUTO: 0.3 % (ref 0–2)
BILIRUB SERPL-MCNC: 0.53 MG/DL (ref 0.2–1.3)
BILIRUB SERPL-MCNC: NEGATIVE MG/DL
BUN SERPL-MCNC: 41 MG/DL (ref 7–18)
CALCIUM SERPL-MCNC: 9.3 MG/DL (ref 8.5–10.1)
CHLORIDE SERPL-SCNC: 97 MMOL/L (ref 98–107)
CO2 SERPL-SCNC: 31.1 MMOL/L (ref 21–32)
COLOR UR: YELLOW
CREAT SERPL-MCNC: 1.7 MG/DL (ref 0.6–1.3)
EOSINOPHIL NFR BLD: 0.6 % (ref 0–7)
ERYTHROCYTE [DISTWIDTH] IN BLOOD BY AUTOMATED COUNT: 13.6 % (ref 11.5–14.5)
GLOBULIN SER-MCNC: 4.8 G/L
GLUCOSE SERPL-MCNC: 176 MG/DL (ref 74–106)
GLUCOSE SERPL-MCNC: NEGATIVE MG/DL
HCT VFR BLD CALC: 47.9 % (ref 42–54)
HGB BLD-MCNC: 16 G/DL (ref 13.5–17.5)
IMM GRANULOCYTES NFR BLD: 0.4 % (ref 0–5)
KETONES UR STRIP-MCNC: NEGATIVE MG/DL
LYMPHOCYTES NFR BLD AUTO: 16.4 % (ref 15–50)
MCH RBC QN AUTO: 29 PG (ref 26–34)
MCHC RBC AUTO-ENTMCNC: 33.4 G/DL (ref 31–37)
MCV RBC: 86.9 FL (ref 80–100)
MONOCYTES NFR BLD: 13.8 % (ref 2–11)
NEUTROPHILS NFR BLD AUTO: 68.5 % (ref 40–80)
NITRITE UR-MCNC: NEGATIVE MG/ML
OSMOLALITY SERPL CALC.SUM OF ELEC: 285 MOSM/KG (ref 275–300)
PH UR STRIP: 5 [PH] (ref 5–6)
PLATELET # BLD: 134 10X3/UL (ref 130–400)
PMV BLD AUTO: 10.8 FL (ref 7.4–10.4)
POTASSIUM SERPL-SCNC: 3.5 MMOL/L (ref 3.5–5.1)
PROT SERPL-MCNC: 7.7 G/DL (ref 6.4–8.2)
PROT UR-MCNC: NEGATIVE MG/DL
RBC # BLD AUTO: 5.51 10X6/UL (ref 4.2–6.1)
SODIUM SERPL-SCNC: 136 MMOL/L (ref 136–145)
SP GR UR STRIP: 1.01 (ref 1–1.02)
UROBILINOGEN UR-MCNC: NORMAL MG/DL
WBC # BLD AUTO: 6.7 10X3/UL (ref 4.8–10.8)

## 2018-01-05 VITALS — SYSTOLIC BLOOD PRESSURE: 109 MMHG | DIASTOLIC BLOOD PRESSURE: 57 MMHG

## 2018-01-05 VITALS — DIASTOLIC BLOOD PRESSURE: 66 MMHG | SYSTOLIC BLOOD PRESSURE: 114 MMHG

## 2018-01-05 VITALS — SYSTOLIC BLOOD PRESSURE: 111 MMHG | DIASTOLIC BLOOD PRESSURE: 62 MMHG

## 2018-01-05 VITALS — SYSTOLIC BLOOD PRESSURE: 110 MMHG | DIASTOLIC BLOOD PRESSURE: 67 MMHG

## 2018-01-05 VITALS — DIASTOLIC BLOOD PRESSURE: 65 MMHG | SYSTOLIC BLOOD PRESSURE: 114 MMHG

## 2018-01-05 LAB
ALBUMIN SERPL-MCNC: 2.7 G/DL (ref 3.4–5)
ALP SERPL-CCNC: 100 U/L (ref 46–116)
ALT SERPL-CCNC: 19 U/L (ref 10–68)
ANION GAP SERPL CALC-SCNC: 8.8 MMOL/L (ref 8–16)
BASOPHILS NFR BLD AUTO: 0.4 % (ref 0–2)
BILIRUB SERPL-MCNC: 0.52 MG/DL (ref 0.2–1.3)
BUN SERPL-MCNC: 47 MG/DL (ref 7–18)
CALCIUM SERPL-MCNC: 8.9 MG/DL (ref 8.5–10.1)
CHLORIDE SERPL-SCNC: 97 MMOL/L (ref 98–107)
CO2 SERPL-SCNC: 32.9 MMOL/L (ref 21–32)
CREAT SERPL-MCNC: 1.9 MG/DL (ref 0.6–1.3)
EOSINOPHIL NFR BLD: 1.1 % (ref 0–7)
ERYTHROCYTE [DISTWIDTH] IN BLOOD BY AUTOMATED COUNT: 13.5 % (ref 11.5–14.5)
GLOBULIN SER-MCNC: 5.1 G/L
GLUCOSE SERPL-MCNC: 101 MG/DL (ref 74–106)
HCT VFR BLD CALC: 50.6 % (ref 42–54)
HGB BLD-MCNC: 16.9 G/DL (ref 13.5–17.5)
IMM GRANULOCYTES NFR BLD: 0.2 % (ref 0–5)
LYMPHOCYTES NFR BLD AUTO: 33.1 % (ref 15–50)
MCH RBC QN AUTO: 28.8 PG (ref 26–34)
MCHC RBC AUTO-ENTMCNC: 33.4 G/DL (ref 31–37)
MCV RBC: 86.2 FL (ref 80–100)
MONOCYTES NFR BLD: 16.6 % (ref 2–11)
NEUTROPHILS NFR BLD AUTO: 48.6 % (ref 40–80)
OSMOLALITY SERPL CALC.SUM OF ELEC: 283 MOSM/KG (ref 275–300)
PLATELET # BLD: 128 10X3/UL (ref 130–400)
PMV BLD AUTO: 10.6 FL (ref 7.4–10.4)
POTASSIUM SERPL-SCNC: 2.7 MMOL/L (ref 3.5–5.1)
PROT SERPL-MCNC: 7.8 G/DL (ref 6.4–8.2)
RBC # BLD AUTO: 5.87 10X6/UL (ref 4.2–6.1)
SODIUM SERPL-SCNC: 136 MMOL/L (ref 136–145)
WBC # BLD AUTO: 4.5 10X3/UL (ref 4.8–10.8)

## 2018-01-06 VITALS — DIASTOLIC BLOOD PRESSURE: 65 MMHG | SYSTOLIC BLOOD PRESSURE: 105 MMHG

## 2018-01-06 VITALS — SYSTOLIC BLOOD PRESSURE: 104 MMHG | DIASTOLIC BLOOD PRESSURE: 62 MMHG

## 2018-01-06 VITALS — SYSTOLIC BLOOD PRESSURE: 130 MMHG | DIASTOLIC BLOOD PRESSURE: 71 MMHG

## 2018-01-06 VITALS — DIASTOLIC BLOOD PRESSURE: 65 MMHG | SYSTOLIC BLOOD PRESSURE: 99 MMHG

## 2018-01-07 VITALS — DIASTOLIC BLOOD PRESSURE: 68 MMHG | SYSTOLIC BLOOD PRESSURE: 114 MMHG

## 2018-01-07 VITALS — SYSTOLIC BLOOD PRESSURE: 115 MMHG | DIASTOLIC BLOOD PRESSURE: 69 MMHG

## 2018-01-07 VITALS — SYSTOLIC BLOOD PRESSURE: 108 MMHG | DIASTOLIC BLOOD PRESSURE: 54 MMHG

## 2018-01-07 VITALS — DIASTOLIC BLOOD PRESSURE: 59 MMHG | SYSTOLIC BLOOD PRESSURE: 117 MMHG

## 2018-01-07 VITALS — SYSTOLIC BLOOD PRESSURE: 101 MMHG | DIASTOLIC BLOOD PRESSURE: 54 MMHG

## 2018-01-08 VITALS — DIASTOLIC BLOOD PRESSURE: 63 MMHG | SYSTOLIC BLOOD PRESSURE: 113 MMHG

## 2018-01-08 VITALS — DIASTOLIC BLOOD PRESSURE: 64 MMHG | SYSTOLIC BLOOD PRESSURE: 111 MMHG

## 2018-01-08 VITALS — DIASTOLIC BLOOD PRESSURE: 60 MMHG | SYSTOLIC BLOOD PRESSURE: 97 MMHG

## 2018-01-18 ENCOUNTER — HOSPITAL ENCOUNTER (INPATIENT)
Dept: HOSPITAL 84 - D.ER | Age: 78
Discharge: HOSPICE | DRG: 300 | End: 2018-01-18
Attending: INTERNAL MEDICINE | Admitting: INTERNAL MEDICINE
Payer: MEDICARE

## 2018-01-18 ENCOUNTER — HOSPITAL ENCOUNTER (INPATIENT)
Dept: HOSPITAL 84 - D.M2 | Age: 78
LOS: 9 days | DRG: 951 | End: 2018-01-27
Attending: FAMILY MEDICINE | Admitting: FAMILY MEDICINE
Payer: COMMERCIAL

## 2018-01-18 VITALS
WEIGHT: 250.73 LBS | BODY MASS INDEX: 32.18 KG/M2 | WEIGHT: 250.73 LBS | BODY MASS INDEX: 32.18 KG/M2 | HEIGHT: 74 IN | HEIGHT: 74 IN | BODY MASS INDEX: 32.18 KG/M2

## 2018-01-18 VITALS — DIASTOLIC BLOOD PRESSURE: 67 MMHG | SYSTOLIC BLOOD PRESSURE: 112 MMHG

## 2018-01-18 VITALS
BODY MASS INDEX: 33.72 KG/M2 | WEIGHT: 262.74 LBS | HEIGHT: 74 IN | BODY MASS INDEX: 33.72 KG/M2 | BODY MASS INDEX: 33.72 KG/M2 | HEIGHT: 74 IN | WEIGHT: 262.74 LBS

## 2018-01-18 VITALS — SYSTOLIC BLOOD PRESSURE: 105 MMHG | DIASTOLIC BLOOD PRESSURE: 51 MMHG

## 2018-01-18 VITALS — DIASTOLIC BLOOD PRESSURE: 51 MMHG | SYSTOLIC BLOOD PRESSURE: 103 MMHG

## 2018-01-18 DIAGNOSIS — Z51.5: Primary | ICD-10-CM

## 2018-01-18 DIAGNOSIS — E11.52: Primary | ICD-10-CM

## 2018-01-18 DIAGNOSIS — E11.65: ICD-10-CM

## 2018-01-18 DIAGNOSIS — I50.9: ICD-10-CM

## 2018-01-18 DIAGNOSIS — Z87.891: ICD-10-CM

## 2018-01-18 DIAGNOSIS — E11.22: ICD-10-CM

## 2018-01-18 DIAGNOSIS — F03.90: ICD-10-CM

## 2018-01-18 DIAGNOSIS — N18.9: ICD-10-CM

## 2018-01-18 DIAGNOSIS — I70.263: ICD-10-CM

## 2018-01-18 DIAGNOSIS — I48.91: ICD-10-CM

## 2018-01-18 DIAGNOSIS — K21.9: ICD-10-CM

## 2018-01-18 DIAGNOSIS — Z86.73: ICD-10-CM

## 2018-01-18 DIAGNOSIS — M17.0: ICD-10-CM

## 2018-01-18 DIAGNOSIS — I13.0: ICD-10-CM

## 2018-01-18 LAB
ALBUMIN SERPL-MCNC: 2.9 G/DL (ref 3.4–5)
ALP SERPL-CCNC: 99 U/L (ref 46–116)
ALT SERPL-CCNC: 15 U/L (ref 10–68)
ANION GAP SERPL CALC-SCNC: 13.7 MMOL/L (ref 8–16)
BASOPHILS NFR BLD AUTO: 0.2 % (ref 0–2)
BILIRUB SERPL-MCNC: 0.79 MG/DL (ref 0.2–1.3)
BUN SERPL-MCNC: 63 MG/DL (ref 7–18)
CALCIUM SERPL-MCNC: 9.6 MG/DL (ref 8.5–10.1)
CHLORIDE SERPL-SCNC: 97 MMOL/L (ref 98–107)
CK MB SERPL-MCNC: 3.9 U/L (ref 0–3.6)
CK SERPL-CCNC: 874 UL (ref 21–232)
CO2 SERPL-SCNC: 32.2 MMOL/L (ref 21–32)
CREAT SERPL-MCNC: 1.9 MG/DL (ref 0.6–1.3)
EOSINOPHIL NFR BLD: 1.2 % (ref 0–7)
ERYTHROCYTE [DISTWIDTH] IN BLOOD BY AUTOMATED COUNT: 14.1 % (ref 11.5–14.5)
GLOBULIN SER-MCNC: 5.1 G/L
GLUCOSE SERPL-MCNC: 167 MG/DL (ref 74–106)
HCT VFR BLD CALC: 47.6 % (ref 42–54)
HGB BLD-MCNC: 15.6 G/DL (ref 13.5–17.5)
IMM GRANULOCYTES NFR BLD: 0.3 % (ref 0–5)
INR PPP: 1.11 (ref 0.85–1.17)
LYMPHOCYTES NFR BLD AUTO: 18.2 % (ref 15–50)
MAGNESIUM SERPL-MCNC: 1.3 MG/DL (ref 1.8–2.4)
MCH RBC QN AUTO: 28.9 PG (ref 26–34)
MCHC RBC AUTO-ENTMCNC: 32.8 G/DL (ref 31–37)
MCV RBC: 88.1 FL (ref 80–100)
MONOCYTES NFR BLD: 10.2 % (ref 2–11)
NEUTROPHILS NFR BLD AUTO: 69.9 % (ref 40–80)
NT-PROBNP SERPL-MCNC: 5264 PG/ML (ref 0–450)
OSMOLALITY SERPL CALC.SUM OF ELEC: 297 MOSM/KG (ref 275–300)
PLATELET # BLD: 179 10X3/UL (ref 130–400)
PMV BLD AUTO: 10.2 FL (ref 7.4–10.4)
POTASSIUM SERPL-SCNC: 4.9 MMOL/L (ref 3.5–5.1)
PROT SERPL-MCNC: 8 G/DL (ref 6.4–8.2)
PROTHROMBIN TIME: 13.9 SECONDS (ref 11.6–15)
RBC # BLD AUTO: 5.4 10X6/UL (ref 4.2–6.1)
SODIUM SERPL-SCNC: 138 MMOL/L (ref 136–145)
TROPONIN I SERPL-MCNC: 0.3 NG/ML (ref 0–0.06)
WBC # BLD AUTO: 9.1 10X3/UL (ref 4.8–10.8)

## 2018-01-19 VITALS — DIASTOLIC BLOOD PRESSURE: 60 MMHG | SYSTOLIC BLOOD PRESSURE: 117 MMHG

## 2018-01-19 VITALS — DIASTOLIC BLOOD PRESSURE: 57 MMHG | SYSTOLIC BLOOD PRESSURE: 112 MMHG

## 2018-01-19 VITALS — SYSTOLIC BLOOD PRESSURE: 110 MMHG | DIASTOLIC BLOOD PRESSURE: 58 MMHG

## 2018-01-19 VITALS — SYSTOLIC BLOOD PRESSURE: 123 MMHG | DIASTOLIC BLOOD PRESSURE: 51 MMHG

## 2018-01-19 VITALS — DIASTOLIC BLOOD PRESSURE: 48 MMHG | SYSTOLIC BLOOD PRESSURE: 114 MMHG

## 2018-01-19 VITALS — SYSTOLIC BLOOD PRESSURE: 107 MMHG | DIASTOLIC BLOOD PRESSURE: 57 MMHG

## 2018-01-20 VITALS — DIASTOLIC BLOOD PRESSURE: 67 MMHG | SYSTOLIC BLOOD PRESSURE: 124 MMHG

## 2018-01-20 VITALS — SYSTOLIC BLOOD PRESSURE: 107 MMHG | DIASTOLIC BLOOD PRESSURE: 57 MMHG

## 2018-01-20 VITALS — DIASTOLIC BLOOD PRESSURE: 59 MMHG | SYSTOLIC BLOOD PRESSURE: 120 MMHG

## 2018-01-20 VITALS — DIASTOLIC BLOOD PRESSURE: 62 MMHG | SYSTOLIC BLOOD PRESSURE: 117 MMHG

## 2018-01-20 VITALS — DIASTOLIC BLOOD PRESSURE: 56 MMHG | SYSTOLIC BLOOD PRESSURE: 110 MMHG

## 2018-01-21 VITALS — DIASTOLIC BLOOD PRESSURE: 58 MMHG | SYSTOLIC BLOOD PRESSURE: 100 MMHG

## 2018-01-21 VITALS — DIASTOLIC BLOOD PRESSURE: 53 MMHG | SYSTOLIC BLOOD PRESSURE: 103 MMHG

## 2018-01-21 VITALS — SYSTOLIC BLOOD PRESSURE: 106 MMHG | DIASTOLIC BLOOD PRESSURE: 53 MMHG

## 2018-01-21 VITALS — SYSTOLIC BLOOD PRESSURE: 108 MMHG | DIASTOLIC BLOOD PRESSURE: 60 MMHG

## 2018-01-21 VITALS — SYSTOLIC BLOOD PRESSURE: 99 MMHG | DIASTOLIC BLOOD PRESSURE: 57 MMHG

## 2018-01-22 VITALS — SYSTOLIC BLOOD PRESSURE: 98 MMHG | DIASTOLIC BLOOD PRESSURE: 53 MMHG

## 2018-01-22 VITALS — SYSTOLIC BLOOD PRESSURE: 108 MMHG | DIASTOLIC BLOOD PRESSURE: 48 MMHG

## 2018-01-22 VITALS — DIASTOLIC BLOOD PRESSURE: 82 MMHG | SYSTOLIC BLOOD PRESSURE: 128 MMHG

## 2018-01-22 VITALS — SYSTOLIC BLOOD PRESSURE: 113 MMHG | DIASTOLIC BLOOD PRESSURE: 66 MMHG

## 2018-01-22 VITALS — DIASTOLIC BLOOD PRESSURE: 48 MMHG | SYSTOLIC BLOOD PRESSURE: 88 MMHG

## 2018-01-22 VITALS — SYSTOLIC BLOOD PRESSURE: 81 MMHG | DIASTOLIC BLOOD PRESSURE: 51 MMHG

## 2018-01-23 VITALS — SYSTOLIC BLOOD PRESSURE: 96 MMHG | DIASTOLIC BLOOD PRESSURE: 43 MMHG

## 2018-01-23 VITALS — DIASTOLIC BLOOD PRESSURE: 48 MMHG | SYSTOLIC BLOOD PRESSURE: 99 MMHG

## 2018-01-23 VITALS — SYSTOLIC BLOOD PRESSURE: 106 MMHG | DIASTOLIC BLOOD PRESSURE: 48 MMHG

## 2018-01-24 VITALS — DIASTOLIC BLOOD PRESSURE: 54 MMHG | SYSTOLIC BLOOD PRESSURE: 99 MMHG

## 2018-01-24 VITALS — SYSTOLIC BLOOD PRESSURE: 116 MMHG | DIASTOLIC BLOOD PRESSURE: 55 MMHG

## 2018-01-24 VITALS — SYSTOLIC BLOOD PRESSURE: 96 MMHG | DIASTOLIC BLOOD PRESSURE: 44 MMHG

## 2018-01-25 VITALS — SYSTOLIC BLOOD PRESSURE: 109 MMHG | DIASTOLIC BLOOD PRESSURE: 49 MMHG

## 2018-01-25 VITALS — DIASTOLIC BLOOD PRESSURE: 51 MMHG | SYSTOLIC BLOOD PRESSURE: 108 MMHG

## 2018-01-25 VITALS — DIASTOLIC BLOOD PRESSURE: 55 MMHG | SYSTOLIC BLOOD PRESSURE: 108 MMHG

## 2018-01-26 VITALS — SYSTOLIC BLOOD PRESSURE: 141 MMHG | DIASTOLIC BLOOD PRESSURE: 62 MMHG

## 2018-01-26 VITALS — DIASTOLIC BLOOD PRESSURE: 58 MMHG | SYSTOLIC BLOOD PRESSURE: 131 MMHG

## 2018-01-27 VITALS — DIASTOLIC BLOOD PRESSURE: 52 MMHG | SYSTOLIC BLOOD PRESSURE: 107 MMHG
